# Patient Record
Sex: FEMALE | Race: AMERICAN INDIAN OR ALASKA NATIVE | NOT HISPANIC OR LATINO | URBAN - METROPOLITAN AREA
[De-identification: names, ages, dates, MRNs, and addresses within clinical notes are randomized per-mention and may not be internally consistent; named-entity substitution may affect disease eponyms.]

---

## 2022-05-05 ENCOUNTER — OFFICE VISIT (OUTPATIENT)
Dept: URBAN - METROPOLITAN AREA CLINIC 63 | Facility: CLINIC | Age: 32
End: 2022-05-05

## 2022-06-17 ENCOUNTER — OFFICE VISIT (OUTPATIENT)
Dept: URBAN - METROPOLITAN AREA SURGERY CENTER 4 | Facility: SURGERY CENTER | Age: 32
End: 2022-06-17

## 2022-06-30 ENCOUNTER — OFFICE VISIT (OUTPATIENT)
Dept: URBAN - METROPOLITAN AREA CLINIC 63 | Facility: CLINIC | Age: 32
End: 2022-06-30
Payer: COMMERCIAL

## 2022-06-30 ENCOUNTER — DASHBOARD ENCOUNTERS (OUTPATIENT)
Age: 32
End: 2022-06-30

## 2022-06-30 ENCOUNTER — WEB ENCOUNTER (OUTPATIENT)
Dept: URBAN - METROPOLITAN AREA CLINIC 63 | Facility: CLINIC | Age: 32
End: 2022-06-30

## 2022-06-30 VITALS
HEART RATE: 104 BPM | DIASTOLIC BLOOD PRESSURE: 80 MMHG | OXYGEN SATURATION: 98 % | SYSTOLIC BLOOD PRESSURE: 120 MMHG | TEMPERATURE: 97.5 F | HEIGHT: 62 IN | BODY MASS INDEX: 41.3 KG/M2 | WEIGHT: 224.4 LBS

## 2022-06-30 DIAGNOSIS — K20.0 EOSINOPHILIC ESOPHAGITIS: ICD-10-CM

## 2022-06-30 DIAGNOSIS — K57.90 DIVERTICULOSIS: ICD-10-CM

## 2022-06-30 DIAGNOSIS — K58.0 IRRITABLE BOWEL SYNDROME WITH DIARRHEA: ICD-10-CM

## 2022-06-30 DIAGNOSIS — K64.1 GRADE II HEMORRHOIDS: ICD-10-CM

## 2022-06-30 DIAGNOSIS — K29.70 GASTRITIS WITHOUT BLEEDING, UNSPECIFIED CHRONICITY, UNSPECIFIED GASTRITIS TYPE: ICD-10-CM

## 2022-06-30 PROCEDURE — 99213 OFFICE O/P EST LOW 20 MIN: CPT | Performed by: PHYSICIAN ASSISTANT

## 2022-06-30 RX ORDER — AZELASTINE HYDROCHLORIDE 137 UG/1
1 PUFF IN EACH NOSTRIL SPRAY, METERED NASAL TWICE A DAY
Status: ACTIVE | COMMUNITY

## 2022-06-30 RX ORDER — HYDROXYZINE HYDROCHLORIDE 25 MG/1
1 TABLET AT BEDTIME AS NEEDED TABLET, FILM COATED ORAL ONCE A DAY
Status: ACTIVE | COMMUNITY

## 2022-06-30 RX ORDER — FAMOTIDINE 20 MG/1
1 TABLET AT BEDTIME AS NEEDED TABLET, FILM COATED ORAL ONCE A DAY
Status: ACTIVE | COMMUNITY

## 2022-06-30 RX ORDER — DICYCLOMINE HYDROCHLORIDE 10 MG/1
1 CAPSULE CAPSULE ORAL
Qty: 120 CAPSULE | Refills: 11 | OUTPATIENT
Start: 2022-06-30 | End: 2023-06-25

## 2022-06-30 RX ORDER — BENRALIZUMAB 30 MG/ML
AS DIRECTED INJECTION, SOLUTION SUBCUTANEOUS
Status: ACTIVE | COMMUNITY

## 2022-06-30 RX ORDER — MONTELUKAST 10 MG/1
1 TABLET TABLET, FILM COATED ORAL ONCE A DAY
Status: ACTIVE | COMMUNITY

## 2022-06-30 NOTE — HPI-TODAY'S VISIT:
32-year-old female with asthma, obesity, anxiety, IBS-D, ankylosing spondylitis, rheumatoid arthritis, Sjogren's syndrome, eosinophilic esophagitis, iron deficiency anemia was referred to the office in May 2022 for evaluation of iron deficiency anemia and history of EOE and IBS.  Her hemoglobin was 8.9 in January 2022.  Ferritin was 1 ng/mL.  She was treated with IV iron and her anemia was thought to be secondary to menstrual losses.  Her labs in February 2022 demonstrated a hemoglobin of 12.3.  CMP was unremarkable.  TSH was normal.  At her initial office visit she complained of multifocal abdominal pain which would vary in location and intensity day by day.  She reported having sharp pain in 1 area of the abdomen for about an hour then it would resolve and reappear in different locations throughout her abdomen.  She had been on dicyclomine in the past which was helpful.  She reported chronic intermittent abdominal bloating and gas.  She had been using IBgard, Gas-X, and a daily probiotic.  She reported moderately severe nausea over the 2 weeks preceding her last office visit.  She took a pregnancy test which was negative.  She denied any vomiting.  She denied weight loss.  She reported a history of chronic diarrhea for all of her life.  She has anywhere from 2-8 episodes of diarrhea per day.  She noticed that her diarrhea worsened following her cholecystectomy in 2012.  She uses Imodium on a demand basis.  Denied any significant lifestyle changes or new medications.  She was seen by GI previously in [place] in 2012 and was diagnosed with EOE.  Her colonoscopy was reportedly normal.  She was prescribed dicyclomine to use 4 times daily as needed since it had provided her with relief in the past.  She was prescribed a trial of cholestyramine 4 g twice a day.  Repeat labs and stool studies were ordered but have not been received.  She follows up after EGD and colonoscopy which were done on June 17, 2022.  Her EGD demonstrated a normal-appearing esophagus.  The Z-line was irregular.  Mild gastritis was found in the gastric body.  The biopsy demonstrated reactive changes biopsies of the middle third of the esophagus were negative for intraepithelial eosinophils..  The gastric antrum appeared normal.  Gastric antral biopsy was negative for H. pylori.  The duodenum appeared normal.  Duodenal biopsy was negative for celiac disease.  Her colonoscopy demonstrated a few diminutive hyperplastic polyps which were removed from the sigmoid colon.  The colonic mucosa appeared normal throughout.  Random colon biopsy was normal.  The terminal ileum appeared normal.  A few diverticula were found in the sigmoid colon.  Grade 2 internal hemorrhoids and external hemorrhoids were observed.  She follows up today doing well. She decided to eliminate gluten from her diet and her nausea resolved and diarrhea has improved. She is having diarrhea about 3 days per week at this point. She states stools are formed 4 days per week. No rectal bleeding. Cholestryamine was not helpful.  Her grandmother had colon cancer at age 76.  Her father had a colostomy due to complicated diverticulitis.  Her mother had a colectomy due to complicated diverticulitis.

## 2022-07-01 PROBLEM — 397881000: Status: ACTIVE | Noted: 2022-07-01

## 2022-07-01 PROBLEM — 4556007: Status: ACTIVE | Noted: 2022-07-01

## 2022-07-01 PROBLEM — 197125005: Status: ACTIVE | Noted: 2022-06-30

## 2022-07-01 PROBLEM — 235599003: Status: ACTIVE | Noted: 2022-07-01

## 2022-11-08 ENCOUNTER — TELEPHONE ENCOUNTER (OUTPATIENT)
Dept: URBAN - METROPOLITAN AREA CLINIC 63 | Facility: CLINIC | Age: 32
End: 2022-11-08

## 2022-11-08 RX ORDER — DICYCLOMINE HYDROCHLORIDE 10 MG/1
1 CAPSULE CAPSULE ORAL
Qty: 120 CAPSULE | Refills: 2